# Patient Record
Sex: FEMALE | Race: WHITE | NOT HISPANIC OR LATINO | Employment: OTHER | URBAN - NONMETROPOLITAN AREA
[De-identification: names, ages, dates, MRNs, and addresses within clinical notes are randomized per-mention and may not be internally consistent; named-entity substitution may affect disease eponyms.]

---

## 2024-09-30 ENCOUNTER — TELEPHONE (OUTPATIENT)
Dept: NEUROSURGERY | Facility: CLINIC | Age: 79
End: 2024-09-30
Payer: MEDICARE

## 2024-09-30 NOTE — TELEPHONE ENCOUNTER
lvm to see if patient could come in on october 7th at 12:30      It is okay for the hub to relay the message to the patient and schedule if possible

## 2024-10-07 ENCOUNTER — HOSPITAL ENCOUNTER (OUTPATIENT)
Dept: CT IMAGING | Facility: HOSPITAL | Age: 79
Discharge: HOME | End: 2024-10-07
Admitting: PHYSICIAN ASSISTANT
Payer: MEDICARE

## 2024-10-07 ENCOUNTER — OFFICE VISIT (OUTPATIENT)
Dept: NEUROSURGERY | Facility: CLINIC | Age: 79
End: 2024-10-07
Payer: MEDICARE

## 2024-10-07 VITALS — HEIGHT: 64 IN | BODY MASS INDEX: 32.91 KG/M2 | WEIGHT: 192.8 LBS

## 2024-10-07 DIAGNOSIS — E66.811 CLASS 1 OBESITY DUE TO EXCESS CALORIES WITHOUT SERIOUS COMORBIDITY WITH BODY MASS INDEX (BMI) OF 33.0 TO 33.9 IN ADULT: ICD-10-CM

## 2024-10-07 DIAGNOSIS — M54.2 CERVICALGIA: ICD-10-CM

## 2024-10-07 DIAGNOSIS — G44.311 INTRACTABLE ACUTE POST-TRAUMATIC HEADACHE: ICD-10-CM

## 2024-10-07 DIAGNOSIS — M51.362 DEGENERATION OF INTERVERTEBRAL DISC OF LUMBAR REGION WITH DISCOGENIC BACK PAIN AND LOWER EXTREMITY PAIN: ICD-10-CM

## 2024-10-07 DIAGNOSIS — G44.311 INTRACTABLE ACUTE POST-TRAUMATIC HEADACHE: Primary | ICD-10-CM

## 2024-10-07 DIAGNOSIS — E66.09 CLASS 1 OBESITY DUE TO EXCESS CALORIES WITHOUT SERIOUS COMORBIDITY WITH BODY MASS INDEX (BMI) OF 33.0 TO 33.9 IN ADULT: ICD-10-CM

## 2024-10-07 DIAGNOSIS — M48.062 SPINAL STENOSIS, LUMBAR REGION, WITH NEUROGENIC CLAUDICATION: ICD-10-CM

## 2024-10-07 DIAGNOSIS — Z78.9 NONSMOKER: ICD-10-CM

## 2024-10-07 DIAGNOSIS — S32.010A CLOSED COMPRESSION FRACTURE OF BODY OF L1 VERTEBRA: ICD-10-CM

## 2024-10-07 PROCEDURE — 70450 CT HEAD/BRAIN W/O DYE: CPT

## 2024-10-07 PROCEDURE — 72125 CT NECK SPINE W/O DYE: CPT

## 2024-10-07 RX ORDER — LISINOPRIL 40 MG/1
1 TABLET ORAL DAILY
COMMUNITY

## 2024-10-07 RX ORDER — PREGABALIN 50 MG/1
CAPSULE ORAL
Qty: 30 CAPSULE | Refills: 0 | Status: SHIPPED | OUTPATIENT
Start: 2024-10-07

## 2024-10-07 RX ORDER — CITALOPRAM HYDROBROMIDE 20 MG/1
1 TABLET ORAL DAILY
COMMUNITY
Start: 2024-08-19

## 2024-10-07 RX ORDER — ALBUTEROL SULFATE 90 UG/1
2 INHALANT RESPIRATORY (INHALATION) EVERY 4 HOURS PRN
COMMUNITY

## 2024-10-07 RX ORDER — CYCLOBENZAPRINE HCL 10 MG
TABLET ORAL
COMMUNITY
Start: 2024-08-23

## 2024-10-07 RX ORDER — OMEGA-3S/DHA/EPA/FISH OIL/D3 300MG-1000
CAPSULE ORAL
COMMUNITY

## 2024-10-07 RX ORDER — FLUTICASONE PROPIONATE AND SALMETEROL 250; 50 UG/1; UG/1
POWDER RESPIRATORY (INHALATION)
COMMUNITY

## 2024-10-07 RX ORDER — ROPINIROLE 0.5 MG/1
1 TABLET, FILM COATED ORAL
COMMUNITY

## 2024-10-07 RX ORDER — ATORVASTATIN CALCIUM 20 MG/1
1 TABLET, FILM COATED ORAL DAILY
COMMUNITY

## 2024-10-07 RX ORDER — METOPROLOL SUCCINATE 50 MG/1
1 TABLET, EXTENDED RELEASE ORAL
COMMUNITY
Start: 2024-06-05

## 2024-10-07 RX ORDER — AMLODIPINE BESYLATE 10 MG/1
1 TABLET ORAL DAILY
COMMUNITY

## 2024-10-07 NOTE — PROGRESS NOTES
Chief complaint:   Chief Complaint   Patient presents with    Back Pain     Pt states having lower back pain and neck pain. Radiating pain in right leg. Had knee replacement on right knee       Subjective     HPI: Anitha is a 78 year-old female referred to us by ABDI Martinez for 2nd opinion chronic low back pain and compression fracture L1. PMH is significant for HTN, COPD, HL and RLS. Pt is accompanied by her son. She has Hx chronic low back pain with radiating pain into the right buttock around the right knee and into the lower leg and dorsal surface of the right foot.  She sustained a fall about a month ago.  She was walking backwards on a ramp and fell, landing on her buttocks and sustained an L1 compression fracture.  Pain persists, especially when she twists.  She does indicate that the pain is not as bad as it was 3 weeks ago.  She states at times her right leg feels weak and thinks she is maybe tripping with her right foot but has not fallen from that.  She is unable to stand very long or walk very far secondary to shortness of breath from emphysema and states that her legs also get weak and feel like they will not hold her.  She denies any paresthesias to the lower extremities.      She is also complaining of worsening chronic neck pain since her fall.  She has been seen and treated by Dr. Bradshaw and is on the schedule for what sounds like cervical facet injections with possible RFA, upcoming.  Since she fell, she has had worsening pain especially the right side of her neck that radiates to the deltoid region and around the right clavicle.  She has had about 8 physical therapy sessions at [facility] which is failed to provide any symptom improvement.  She also reports significant increase in her baseline headaches.  Headache is located mostly in the occipital region.  She has had some blurred vision over the past 2 days.  Her son denies that she has been confused.  She does not seem to be having any  "difficulty with word finding.  She is not on blood thinners.        Review of Systems     Past Medical History:   Diagnosis Date    Arthritis     Hypertension      Past Surgical History:   Procedure Laterality Date    TOTAL KNEE ARTHROPLASTY       History reviewed. No pertinent family history.  Social History     Tobacco Use    Smoking status: Never     Passive exposure: Never    Smokeless tobacco: Never   Vaping Use    Vaping status: Never Used     (Not in a hospital admission)    Allergies:  Patient has no known allergies.    Objective      Vital Signs  Ht 162.6 cm (64\")   Wt 87.5 kg (192 lb 12.8 oz)   BMI 33.09 kg/m²     Physical Exam  Constitutional:       Appearance: Normal appearance. She is well-developed.   HENT:      Head: Normocephalic.   Eyes:      General: Lids are normal.      Extraocular Movements: Extraocular movements intact.      Conjunctiva/sclera: Conjunctivae normal.   Neck:      Comments: Significant pain with cervical ROM, especially with extension and rotation to the right.  There is also some tenderness of the occipital condyles on the left.  Cardiovascular:      Rate and Rhythm: Normal rate.   Pulmonary:      Effort: Pulmonary effort is normal.      Breath sounds: Normal breath sounds.   Musculoskeletal:         General: Tenderness (TTP and percussion over the L1 region.) present.      Cervical back: Tenderness present.   Skin:     General: Skin is warm and dry.   Neurological:      Mental Status: She is oriented to person, place, and time.      GCS: GCS eye subscore is 4. GCS verbal subscore is 5. GCS motor subscore is 6.      Cranial Nerves: No cranial nerve deficit.      Sensory: No sensory deficit.      Motor: Motor strength is normal.No weakness.      Coordination: Coordination normal.      Gait: Gait normal.      Deep Tendon Reflexes: Reflexes normal.      Reflex Scores:       Tricep reflexes are 2+ on the right side and 2+ on the left side.       Bicep reflexes are 2+ on the right " side and 2+ on the left side.       Brachioradialis reflexes are 2+ on the right side and 2+ on the left side.       Patellar reflexes are 2+ on the right side and 2+ on the left side.       Achilles reflexes are 2+ on the right side and 2+ on the left side.  Psychiatric:         Speech: Speech normal.         Behavior: Behavior normal.         Thought Content: Thought content normal.         Neurological Exam  Mental Status  Awake, alert and oriented to person, place and time. Oriented to person, place, and time. Speech is normal. Follows two-step commands. Attention and concentration are normal. Fund of knowledge is appropriate for level of education.    Cranial Nerves  CN II: Visual acuity is normal. Visual fields full to confrontation.  CN III, IV, VI: Extraocular movements intact bilaterally. Normal lids and orbits bilaterally.   Right pupil: 3 mm. Round. Reactive to light. Reactive to accommodation.   Left pupil: 3 mm. Round. Reactive to light.  CN V: Facial sensation is normal.  CN VII: Full and symmetric facial movement.  CN IX, X: Palate elevates symmetrically. Normal gag reflex.  CN XI: Shoulder shrug strength is normal.  CN XII: Tongue midline without atrophy or fasciculations.    Motor  Decreased muscle bulk throughout. Normal muscle tone. Strength is 5/5 throughout all four extremities.    Sensory  Sensation is intact to light touch, pinprick, vibration and proprioception in all four extremities.    Reflexes                                            Right                      Left  Brachioradialis                    2+                         2+  Biceps                                 2+                         2+  Triceps                                2+                         2+  Finger flex                           2+                         2+  Hamstring                            2+                         2+  Patellar                                2+                         2+  Achilles                                 2+                         2+    Right pathological reflexes: Riley's absent. Ankle clonus absent.  Left pathological reflexes: Riley's absent. Ankle clonus absent.    Coordination  Right: Rapid alternating movement normal.    Gait   Normal gait.Casual gait is normal including stance, stride, and arm swing.       Imaging review: MRI of the lumbar spine was reviewed and demonstrates severe disc degeneration L4-5 and L5-S1.  There is anterior listhesis L4 over L5 with severe central stenosis.  There is moderate right neuroforaminal stenosis at L4-5 and L5-S1.  There is lateral recess stenosis at both levels and what appears to be a right eccentric disc herniation at L5-S1.  There is an acute compression deformity, 30% of L1.  No retropulsion.    CT Cervical Spine Without Contrast    Result Date: 10/7/2024  1. No evidence of cervical spine fracture. Straightening on the sagittal images is likely positional. 2. Degenerative changes of the cervical spine, as described.     This report was signed and finalized on 10/7/2024 1:03 PM by Dr. Edwin Garcia MD.      CT Head Without Contrast    Result Date: 10/7/2024  No acute intracranial abnormality. There are chronic findings associated with aging.   This report was signed and finalized on 10/7/2024 12:59 PM by Dr. Tushar Ang MD.           Assessment/Plan: Anitha is having severe occipital headache with blurred vision over the past 2 days as well as severe right-sided neck pain.  Both issues have escalated since her fall 1 month ago.  She is neurologically stable but given the fact that her headache is so severe as well as the vision changes, I sent her for a stat CT of the head without contrast to make sure she does not have any bleeding as well as  CT of the cervical spine to better evaluate her cervical issues.  Fortunately, her CT of the head did not show anything acute.  Her cervical CT however shows significant spondylosis C1-C2,  C3-C4 and C4-C5 on the right, consistent with a right sided neck pain. No acute findings.    Images were reviewed in detail by myself and Dr. Glover.    I reviewed lumbar images in detail with Anitha and her son.  She has severe central stenosis at L4-5 with anterior listhesis consistent with her radicular complaints as well as an acute L1 compression fracture.    Regarding her lumbar issues, I recommend an LSO brace when standing and walking to prevent any bending or twisting as well as 10 pound lifting restriction over the next 4 to 6 weeks to see how she does.  With her breathing issues, general anesthesia might present some risk even though it is a relatively small surgery.    Regarding her cervical issues, she is neurologically stable but having significant pain.  She is scheduled for an upcoming procedure with Dr. Bradshaw, cervical facet rhizotomies at C4-5 and C5-6.  We will send my note over his office.  She may benefit from facet injections with possible RFA in the upper cervical levels.    I would like to get both lumbar and cervical flexion-extension films today to see if there is any instability.    We will also proceed with MRI of the cervical spine to see if there is any significant stenosis that needs addressed surgically.    I offered her a small dose of Lyrica, 50 mg to utilize at bedtime as needed for nerve pain.    We discussed avoidance of aggravating activities as well as protective body mechanics.  She will follow-up in the office after the cervical MRI is completed, same day.    She can call for sooner appointment if she has any worsening pain, focal weakness or other issues or concerns.      Patient is a nonsmoker    The patient's Body mass index is 33.09 kg/m².. BMI is above normal parameters. Recommendations include: educational material    ADVANCED CARE PLANNING  Information on advance directives provided in AVS.    SEN Fall Risk Assessment was completed, and patient is at HIGH risk for  falls. Assessment completed on:10/7/2024       Diagnoses and all orders for this visit:    1. Intractable acute post-traumatic headache (Primary)  -     CT head wo contrast; Future    2. Closed compression fracture of body of L1 vertebra  -     XR Spine Lumbar AP & Lateral With Flex & Ext; Future  -     Miscellaneous DME    3. Cervicalgia  -     CT Cervical Spine Without Contrast; Future  -     pregabalin (Lyrica) 50 MG capsule; TAKE AT NIGHT FOR NERVE PAIN  Dispense: 30 capsule; Refill: 0  -     XR spine cervical ap and lat w flex and ext; Future  -     MRI Cervical Spine Without Contrast; Future    4. Degeneration of intervertebral disc of lumbar region with discogenic back pain and lower extremity pain  -     XR Spine Lumbar AP & Lateral With Flex & Ext; Future    5. Spinal stenosis, lumbar region, with neurogenic claudication  -     pregabalin (Lyrica) 50 MG capsule; TAKE AT NIGHT FOR NERVE PAIN  Dispense: 30 capsule; Refill: 0  -     XR Spine Lumbar AP & Lateral With Flex & Ext; Future    6. Nonsmoker    7. Class 1 obesity due to excess calories without serious comorbidity with body mass index (BMI) of 33.0 to 33.9 in adult      I spent 75 minutes caring for Anitha on this date of service. This time includes time spent by me in the following activities: preparing for the visit, reviewing tests, obtaining and/or reviewing a separately obtained history, performing a medically appropriate examination and/or evaluation, counseling and educating the patient/family/caregiver, ordering medications, tests, or procedures, referring and communicating with other health care professionals, documenting information in the medical record, independently interpreting results and communicating that information with the patient/family/caregiver, and care coordination.      I discussed the patients findings and my recommendations with patient    Getachew Sampson PA-C  10/08/24  10:10 CDT

## 2024-10-07 NOTE — PATIENT INSTRUCTIONS
Wear lumbar brace when standing and walking.  No lifting greater than 10 pounds.  You can continue Tylenol as current.  Dr. Glover will send you some tramadol and to reserve for more severe pain.  We are also sending Lyrica to help with the leg pain at night.  MRIs been ordered of the cervical spine.  We will have that completed with follow-up after on the same day.  If you have any worsening pain, focal weakness or other issues, call the office for sooner appointment.

## 2024-11-18 ENCOUNTER — HOSPITAL ENCOUNTER (OUTPATIENT)
Dept: MRI IMAGING | Facility: HOSPITAL | Age: 79
Discharge: HOME | End: 2024-11-18
Admitting: PHYSICIAN ASSISTANT
Payer: MEDICARE

## 2024-11-18 DIAGNOSIS — M54.2 CERVICALGIA: ICD-10-CM

## 2024-11-18 PROCEDURE — 72141 MRI NECK SPINE W/O DYE: CPT

## 2024-11-20 ENCOUNTER — HOSPITAL ENCOUNTER (OUTPATIENT)
Dept: GENERAL RADIOLOGY | Facility: HOSPITAL | Age: 79
Discharge: HOME | End: 2024-11-20
Payer: MEDICARE

## 2024-11-20 ENCOUNTER — OFFICE VISIT (OUTPATIENT)
Dept: NEUROSURGERY | Facility: CLINIC | Age: 79
End: 2024-11-20
Payer: MEDICARE

## 2024-11-20 VITALS — BODY MASS INDEX: 33.32 KG/M2 | WEIGHT: 195.2 LBS | HEIGHT: 64 IN

## 2024-11-20 DIAGNOSIS — M48.062 SPINAL STENOSIS, LUMBAR REGION, WITH NEUROGENIC CLAUDICATION: ICD-10-CM

## 2024-11-20 DIAGNOSIS — E66.09 CLASS 1 OBESITY DUE TO EXCESS CALORIES WITHOUT SERIOUS COMORBIDITY WITH BODY MASS INDEX (BMI) OF 33.0 TO 33.9 IN ADULT: ICD-10-CM

## 2024-11-20 DIAGNOSIS — S32.010A CLOSED COMPRESSION FRACTURE OF BODY OF L1 VERTEBRA: ICD-10-CM

## 2024-11-20 DIAGNOSIS — M51.362 DEGENERATION OF INTERVERTEBRAL DISC OF LUMBAR REGION WITH DISCOGENIC BACK PAIN AND LOWER EXTREMITY PAIN: ICD-10-CM

## 2024-11-20 DIAGNOSIS — M54.81 OCCIPITAL NEURALGIA OF RIGHT SIDE: Primary | ICD-10-CM

## 2024-11-20 DIAGNOSIS — G44.311 INTRACTABLE ACUTE POST-TRAUMATIC HEADACHE: ICD-10-CM

## 2024-11-20 DIAGNOSIS — Z78.9 NONSMOKER: ICD-10-CM

## 2024-11-20 DIAGNOSIS — M54.12 CERVICAL RADICULOPATHY: ICD-10-CM

## 2024-11-20 DIAGNOSIS — M50.30 DDD (DEGENERATIVE DISC DISEASE), CERVICAL: ICD-10-CM

## 2024-11-20 DIAGNOSIS — S32.010D COMPRESSION FRACTURE OF L1 VERTEBRA WITH ROUTINE HEALING, SUBSEQUENT ENCOUNTER: ICD-10-CM

## 2024-11-20 DIAGNOSIS — E66.811 CLASS 1 OBESITY DUE TO EXCESS CALORIES WITHOUT SERIOUS COMORBIDITY WITH BODY MASS INDEX (BMI) OF 33.0 TO 33.9 IN ADULT: ICD-10-CM

## 2024-11-20 DIAGNOSIS — M54.2 CERVICALGIA: ICD-10-CM

## 2024-11-20 PROCEDURE — 72050 X-RAY EXAM NECK SPINE 4/5VWS: CPT

## 2024-11-20 PROCEDURE — 72110 X-RAY EXAM L-2 SPINE 4/>VWS: CPT

## 2024-11-20 RX ORDER — OMEGA-3S/DHA/EPA/FISH OIL/D3 300MG-1000
CAPSULE ORAL DAILY
COMMUNITY

## 2024-11-20 RX ORDER — PREDNISONE 20 MG/1
20 TABLET ORAL 2 TIMES DAILY
Qty: 10 TABLET | Refills: 0 | Status: SHIPPED | OUTPATIENT
Start: 2024-11-20

## 2024-11-20 RX ORDER — ALBUTEROL SULFATE 90 UG/1
INHALANT RESPIRATORY (INHALATION) EVERY 6 HOURS PRN
COMMUNITY

## 2024-11-20 RX ORDER — METHOCARBAMOL 500 MG/1
500 TABLET, FILM COATED ORAL 3 TIMES DAILY
Qty: 90 TABLET | Refills: 0 | Status: SHIPPED | OUTPATIENT
Start: 2024-11-20 | End: 2024-11-21

## 2024-11-20 NOTE — PATIENT INSTRUCTIONS
Recommendations have been sent to Dr. Walton's office for occipital blocks to help your headaches as well as right C3-4 epidural steroid to help with the radiating pain from the right side of your neck.  Avoid any excessive cervical rotation as well as extension.  No heavy lifting or overhead lifting.  Prednisone has been sent to your pharmacy to utilize for the next 5 days.  I have also changed your muscle relaxer from cyclobenzaprine to Robaxin to see if this is more effective.  We have referred you to neurology for further evaluation of headaches.  Follow-up with Dr. Glover  If you have any worsening pain, focal weakness or other issues or concerns, call the office or seek treatment at the ER.

## 2024-11-20 NOTE — PROGRESS NOTES
"    Chief complaint:   Chief Complaint   Patient presents with    Follow-up     Pt reports to office for follow up after MRI- Pt states she has headaches all the time         Subjective     HPI: Anitha comes in today with her son for MRI review of the cervical spine. She continues with severe right sided neck pain that worsens with rotation to the right. She has radiating pain into the right trap and clavicle. She continues with right sided occipital tenderness with right hemicranial headaches. She had C4-7 MBB's on the right, Dr Weber and is scheduled for upcoming 2nd set.  Last visit I recommended right occipital blocks to see if this would help with her headaches which has not yet been completed.  She is taking Flexeril without symptom relief.  She does not feel that the Lyrica was beneficial.  She denies any weakness to the upper extremities and paresthesias.    She still wearing a lumbar brace.  She states that her back pain is doing fine.  She really has not have any back pain to speak of and does not have any radicular pain down the right leg.  She denies focal weakness and paresthesias.    Review of Systems      Objective      Vital Signs  Ht 162.6 cm (64.02\")   Wt 88.5 kg (195 lb 3.2 oz)   BMI 33.49 kg/m²     Physical Exam  Constitutional:       Appearance: Normal appearance. She is well-developed.   HENT:      Head: Normocephalic.   Eyes:      Pupils: Pupils are equal, round, and reactive to light.   Neck:      Comments: Positive Spurling's and significantly painful cervical rotation to the right.  There is exquisite tenderness right occipital condyles.  This reproduces headache.  Cardiovascular:      Rate and Rhythm: Normal rate.   Pulmonary:      Effort: Pulmonary effort is normal.   Musculoskeletal:         General: Normal range of motion.   Skin:     General: Skin is warm and dry.   Neurological:      Mental Status: She is oriented to person, place, and time.      GCS: GCS eye subscore is 4. GCS " verbal subscore is 5. GCS motor subscore is 6.      Cranial Nerves: No cranial nerve deficit.      Sensory: No sensory deficit.      Motor: Motor strength is normal.No weakness.      Gait: Gait normal.      Deep Tendon Reflexes:      Reflex Scores:       Tricep reflexes are 2+ on the right side and 2+ on the left side.       Bicep reflexes are 2+ on the right side and 2+ on the left side.       Brachioradialis reflexes are 2+ on the right side and 2+ on the left side.       Patellar reflexes are 2+ on the right side and 2+ on the left side.       Achilles reflexes are 2+ on the right side and 2+ on the left side.  Psychiatric:         Speech: Speech normal.         Behavior: Behavior normal.         Thought Content: Thought content normal.         Neurological Exam  Mental Status  Awake, alert and oriented to person, place and time. Oriented to person, place, and time. Speech is normal.    Cranial Nerves  CN III, IV, VI: Pupils equal round and reactive to light bilaterally.    Motor   Strength is 5/5 throughout all four extremities.    Sensory  Sensation is intact to light touch, pinprick, vibration and proprioception in all four extremities.    Reflexes                                            Right                      Left  Brachioradialis                    2+                         2+  Biceps                                 2+                         2+  Triceps                                2+                         2+  Patellar                                2+                         2+  Achilles                                2+                         2+    Gait   Normal gait.Casual gait is normal including stance, stride, and arm swing.       Imaging review: XR Spine Lumbar AP & Lateral With Flex & Ext    Result Date: 11/20/2024   1. Chronic superior endplate compression fracture at L1 without significant posterior displacement. 2. Osteopenia lower lumbar spondylosis and mid to lower lumbar facet  arthropathy without spondylolisthesis or instability.  This report was signed and finalized on 11/20/2024 4:34 PM by Jerel Harrell.      XR spine cervical ap and lat w flex and ext    Result Date: 11/20/2024  1. Mid cervical spondylosis and multilevel facet arthropathy with degenerative anterolisthesis at C4-C5 and no instability on the lateral flexion and extension views.  This report was signed and finalized on 11/20/2024 4:32 PM by Jerel Harrell.        MRI of the cervical spine dated 11/18/2024 was reviewed and demonstrates widely patent central cord.  There is no significant central stenosis throughout.  There is moderate right neuroforaminal stenosis at C3-4 and severe right neuroforaminal stenosis at C4-5.    Assessment/Plan: I reviewed images in detail with Anitha and her son.  Fortunately she does not have any significant central stenosis of her cervical spine but she does have neuroforaminal stenosis on the right at C3-4 and C4-5 consistent with her radicular complaints.  She also has a right hemicranial headache with exquisite tenderness over the right occipital condyles.  The headache and neck pain are severe.  She is neurologically intact.    I will send recommendations to Dr. Bradshaw's office for a right C4 ALICE to see if this will help with her radicular pain as well as right occipital block with steroid to see if we can help with her headaches.    To assist with pain today, we will change her from Flexeril to Robaxin since Flexeril has been ineffective and I will give her a 5-day course of prednisone to see if this will help with her pain.    Her lumbar symptoms are stable.  She is really not having any back pain so she can start weaning out of the LSO brace.    I think we will go ahead and get her referred to neurology for full headache workup.    She will follow-up with Dr. Glover next available appointment.  She will call for sooner appointment if she has any worsening pain, focal weakness or  other issues or concerns.    Patient is a nonsmoker    The patient's Body mass index is 33.49 kg/m².. BMI is above normal parameters. Recommendations include: educational material    ADVANCED CARE PLANNING  Information on advance directives provided in AVS.    SEN Fall Risk Assessment was completed, and patient is at HIGH risk for falls. Assessment completed on:10/7/2024     Diagnoses and all orders for this visit:    1. Occipital neuralgia of right side (Primary)  -     predniSONE (DELTASONE) 20 MG tablet; Take 1 tablet by mouth 2 (Two) Times a Day.  Dispense: 10 tablet; Refill: 0  -     Discontinue: methocarbamol (ROBAXIN) 500 MG tablet; Take 1 tablet by mouth 3 (Three) Times a Day.  Dispense: 90 tablet; Refill: 0    2. Intractable acute post-traumatic headache  -     Ambulatory Referral to Neurology    3. DDD (degenerative disc disease), cervical    4. Cervical radiculopathy    5. Compression fracture of L1 vertebra with routine healing, subsequent encounter    6. Spinal stenosis, lumbar region, with neurogenic claudication    7. Nonsmoker    8. Class 1 obesity due to excess calories without serious comorbidity with body mass index (BMI) of 33.0 to 33.9 in adult        I discussed the patients findings and my recommendations with patient  Getachew Sampson PA-C  11/21/24  10:10 CST

## 2024-11-21 RX ORDER — CYCLOBENZAPRINE HCL 10 MG
10 TABLET ORAL 3 TIMES DAILY PRN
Qty: 30 TABLET | Refills: 0 | Status: SHIPPED | OUTPATIENT
Start: 2024-11-21

## 2024-11-25 ENCOUNTER — TELEPHONE (OUTPATIENT)
Dept: NEUROSURGERY | Facility: CLINIC | Age: 79
End: 2024-11-25
Payer: MEDICARE

## 2024-11-25 NOTE — TELEPHONE ENCOUNTER
returned call to patient she lvm stating that the medication meshia had perscribed is helping.

## 2025-02-05 ENCOUNTER — OFFICE VISIT (OUTPATIENT)
Dept: NEUROSURGERY | Facility: CLINIC | Age: 80
End: 2025-02-05
Payer: MEDICARE

## 2025-02-05 VITALS — BODY MASS INDEX: 33.29 KG/M2 | HEIGHT: 64 IN | WEIGHT: 195 LBS

## 2025-02-05 DIAGNOSIS — E66.811 CLASS 1 OBESITY DUE TO EXCESS CALORIES WITH SERIOUS COMORBIDITY AND BODY MASS INDEX (BMI) OF 33.0 TO 33.9 IN ADULT: Primary | ICD-10-CM

## 2025-02-05 DIAGNOSIS — M50.30 DDD (DEGENERATIVE DISC DISEASE), CERVICAL: ICD-10-CM

## 2025-02-05 DIAGNOSIS — E66.09 CLASS 1 OBESITY DUE TO EXCESS CALORIES WITH SERIOUS COMORBIDITY AND BODY MASS INDEX (BMI) OF 33.0 TO 33.9 IN ADULT: Primary | ICD-10-CM

## 2025-02-05 PROCEDURE — 99214 OFFICE O/P EST MOD 30 MIN: CPT | Performed by: NEUROLOGICAL SURGERY

## 2025-02-05 RX ORDER — TRAMADOL HYDROCHLORIDE 50 MG/1
50 TABLET ORAL EVERY 6 HOURS PRN
COMMUNITY
Start: 2024-12-06

## 2025-02-05 RX ORDER — HYDROCODONE BITARTRATE AND ACETAMINOPHEN 5; 325 MG/1; MG/1
1 TABLET ORAL DAILY
COMMUNITY
Start: 2025-01-29

## 2025-02-05 NOTE — PROGRESS NOTES
"C4/5 ACDF    Chief complaint:   Chief Complaint   Patient presents with    Follow-up     Follow up compression fractures, headaches        Subjective     HPI: I had a chance to see Anitha today in follow-up and to review her imaging studies of the cervical spine.  Unfortunately Anitha is still having a significant amount of neck pain and arm pain and she clearly has rather advanced degenerative disc disease particularly at C4/5 which is consistent with her arm pain.  Because she has not gotten any relief with conservative management I did offer her an anterior cervical discectomy and fusion at C4/5.  On her flexion-extension films she also has some instability at this level with a significant anterolisthesis of C4 on C5 and this is what is most likely causing her symptoms.    Review of Systems      Objective      Vital Signs  Ht 162.6 cm (64\")   Wt 88.5 kg (195 lb)   BMI 33.47 kg/m²     Physical Exam  Eyes:      General: Lids are normal.      Extraocular Movements: Extraocular movements intact.   Neurological:      Motor: Motor strength is normal.  Psychiatric:         Speech: Speech normal.         Neurological Exam  Mental Status  Awake, alert and oriented to person, place and time. Oriented to person, place and time. Speech is normal. Language is fluent with no aphasia. Attention and concentration are normal. Fund of knowledge is appropriate for level of education.    Cranial Nerves  CN III, IV, VI: Extraocular movements intact bilaterally. Normal lids and orbits bilaterally.    Motor   No abnormal involuntary movements. Strength is 5/5 throughout all four extremities.    Gait  Casual gait is normal including stance, stride, and arm swing.       Imaging review: EXAM: XR SPINE CERVICAL AP AND LAT W FLEX AND EXT-      DATE: 11/20/2024 3:05 PM     HISTORY: cervicalgia; M54.2-Cervicalgia       COMPARISON: 10/7/2024 CT cervical spine.     TECHNIQUE:  Frontal and lateral views were obtained. 4.0 images.       FINDINGS: "    There is straightening which could indicate muscle spasm. There is  spondylosis with spurring of the endplates anteriorly and multilevel  facet arthropathy. There is anterior listhesis at C4-C5 measuring 0.2 cm  which is unchanged on the lateral flexion and extension views.        IMPRESSION:  1. Mid cervical spondylosis and multilevel facet arthropathy with  degenerative anterolisthesis at C4-C5 and no instability on the lateral  flexion and extension views.             Assessment/Plan:   C4/5 instability with anterolisthesis of C4 on C5 and severe neck and arm pain    Given the fact that Anitha has failed conservative management I did offer her an anterior cervical discectomy and fusion at C4/5 to see if we could relieve some of her neck and arm pain.  I did explain to her the risks and benefits of the procedure and she would like to proceed.  We will work on getting her medically cleared and on the operative schedule at our first opening.    Patient is a nonsmoker  The patient's Body mass index is 33.47 kg/m².. BMI is above normal parameters. Recommendations include: continue with current weight loss program    Diagnoses and all orders for this visit:    1. Class 1 obesity due to excess calories with serious comorbidity and body mass index (BMI) of 33.0 to 33.9 in adult (Primary)        I discussed the patients findings and my recommendations with patient  Moncho Glover DO  02/05/25  13:48 CST

## 2025-02-10 ENCOUNTER — TELEPHONE (OUTPATIENT)
Dept: NEUROSURGERY | Facility: CLINIC | Age: 80
End: 2025-02-10
Payer: MEDICARE

## 2025-02-10 NOTE — TELEPHONE ENCOUNTER
Provider: DR MULLEN    Caller: PENNY DURAN    Relationship to Patient: SELF      Phone Number: [phone]    Reason for Call: PT STATES HER PCP HAS SENT HER TO A NEUROLOGIST.  HE IS WANTING HER TO TRY SOME KIND OF THERAPY.  SHE IS WONDERING IF THIS DOESN'T WORK CAN SHE STILL BE SCHEDULED FOR THE SURGERY WITH DR MULLEN.    PLEASE ADVISE    When was the patient last seen: 2-5-25

## 2025-02-17 PROBLEM — E66.09 CLASS 1 OBESITY DUE TO EXCESS CALORIES WITH SERIOUS COMORBIDITY AND BODY MASS INDEX (BMI) OF 33.0 TO 33.9 IN ADULT: Status: ACTIVE | Noted: 2025-02-05

## 2025-02-17 PROBLEM — E66.811 CLASS 1 OBESITY DUE TO EXCESS CALORIES WITH SERIOUS COMORBIDITY AND BODY MASS INDEX (BMI) OF 33.0 TO 33.9 IN ADULT: Status: ACTIVE | Noted: 2025-02-05

## 2025-02-24 NOTE — TELEPHONE ENCOUNTER
PATIENT CALLED BACK IN ON SURGERY SCHEDULING     ATTEMPTED WT AND PER CAMILLE I AM SENDING T/E     PLEASE CALL PATIENT WITH ANY UPDATES ON SURGERY SCHEDULING     [phone] (home)     THANK YOU!

## 2025-02-24 NOTE — TELEPHONE ENCOUNTER
I HAVE RECEIVED A CASE REQUEST FROM DR MULLEN. PT WILL BE SCHEDULED FOR SURGERY IN THE ORDER THAT I RECEIVE CASE REQUESTS. IF PT CALLS BACK IT IS OK FOR HUB TO RELAY TO HER THAT I RECEIVED HER REQUEST ON 2/17 AND IT TYPICALLY TAKES ME 8 WEEKS OR SO TO SCHEDULE SURGERIES FOR DR MULLEN PATIENTS DUE TO THE HIGH VOLUME. CURRENTLY I AM WORKING ON MID JANUARY CASES FOR DR MULLEN AND THESE ARE BOOKING OUT TO LATE MARCH EARLY APRIL.     OK FOR [facility] TO RELAY THIS MESSAGE TO PT.

## 2025-03-05 RX ORDER — CYCLOBENZAPRINE HCL 10 MG
10 TABLET ORAL 3 TIMES DAILY PRN
Qty: 30 TABLET | Refills: 0 | Status: SHIPPED | OUTPATIENT
Start: 2025-03-05

## 2025-03-19 DIAGNOSIS — M54.2 CERVICALGIA: ICD-10-CM

## 2025-03-19 DIAGNOSIS — M48.062 SPINAL STENOSIS, LUMBAR REGION, WITH NEUROGENIC CLAUDICATION: ICD-10-CM

## 2025-03-19 RX ORDER — PREGABALIN 50 MG/1
CAPSULE ORAL
Qty: 30 CAPSULE | Refills: 0 | Status: SHIPPED | OUTPATIENT
Start: 2025-03-19

## 2025-04-02 ENCOUNTER — PRE-ADMISSION TESTING (OUTPATIENT)
Dept: PREADMISSION TESTING | Facility: HOSPITAL | Age: 80
End: 2025-04-02
Payer: MEDICARE

## 2025-04-02 VITALS
OXYGEN SATURATION: 89 % | WEIGHT: 193.78 LBS | SYSTOLIC BLOOD PRESSURE: 157 MMHG | BODY MASS INDEX: 34.34 KG/M2 | HEART RATE: 89 BPM | DIASTOLIC BLOOD PRESSURE: 78 MMHG | HEIGHT: 63 IN | RESPIRATION RATE: 18 BRPM

## 2025-04-02 LAB
ANION GAP SERPL CALCULATED.3IONS-SCNC: 11 MMOL/L (ref 5–15)
BUN SERPL-MCNC: 19 MG/DL (ref 8–23)
BUN/CREAT SERPL: 19.2 (ref 7–25)
CALCIUM SPEC-SCNC: 9.9 MG/DL (ref 8.6–10.5)
CHLORIDE SERPL-SCNC: 103 MMOL/L (ref 98–107)
CO2 SERPL-SCNC: 25 MMOL/L (ref 22–29)
CREAT SERPL-MCNC: 0.99 MG/DL (ref 0.57–1)
DEPRECATED RDW RBC AUTO: 45.3 FL (ref 37–54)
EGFRCR SERPLBLD CKD-EPI 2021: 58.5 ML/MIN/1.73
ERYTHROCYTE [DISTWIDTH] IN BLOOD BY AUTOMATED COUNT: 14.1 % (ref 12.3–15.4)
GLUCOSE SERPL-MCNC: 100 MG/DL (ref 65–99)
HCT VFR BLD AUTO: 38.8 % (ref 34–46.6)
HGB BLD-MCNC: 12.6 G/DL (ref 12–15.9)
MCH RBC QN AUTO: 28.6 PG (ref 26.6–33)
MCHC RBC AUTO-ENTMCNC: 32.5 G/DL (ref 31.5–35.7)
MCV RBC AUTO: 88.2 FL (ref 79–97)
PLATELET # BLD AUTO: 251 10*3/MM3 (ref 140–450)
PMV BLD AUTO: 10.5 FL (ref 6–12)
POTASSIUM SERPL-SCNC: 4.5 MMOL/L (ref 3.5–5.2)
RBC # BLD AUTO: 4.4 10*6/MM3 (ref 3.77–5.28)
SODIUM SERPL-SCNC: 139 MMOL/L (ref 136–145)
WBC NRBC COR # BLD AUTO: 6.91 10*3/MM3 (ref 3.4–10.8)

## 2025-04-02 PROCEDURE — 36415 COLL VENOUS BLD VENIPUNCTURE: CPT

## 2025-04-02 PROCEDURE — 80048 BASIC METABOLIC PNL TOTAL CA: CPT

## 2025-04-02 PROCEDURE — 85027 COMPLETE CBC AUTOMATED: CPT

## 2025-04-02 NOTE — DISCHARGE INSTRUCTIONS
Preparing for Surgery  Follow these instructions before the procedure:  Several days or weeks before your procedure  Medication(s) you need to stop   _______ days/week prior to surgery: ***      Ask your health care provider about:  Changing or stopping your regular medicines. This is especially important if you are taking diabetes medicines or blood thinners.  Taking medicines such as aspirin and ibuprofen. These medicines can thin your blood. Do not take these medicines unless your health care provider tells you to take them.  Taking over-the-counter medicines, vitamins, herbs, and supplements.    Contact your surgeon if you:  Develop a fever of more than 100.4°F (38°C) or other feelings of illness during the 48 hours before your surgery.  Have symptoms that get worse.  Have questions or concerns about your surgery.  If you are going home the same day of your surgery you will need to arrange for a responsible adult, age 18 years old or older, to drive you home from the hospital and stay with you for 24 hours. Verification of the  will be made prior to any procedure requiring sedation. You may not go home in a taxi or any form of public transportation by yourself.     Day before your procedure  Medication(s) you need to stop the day before your surgery:LISINOPRIL    24 hours before your procedure DO NOT drink alcoholic beverages or smoke.  24 hours before your procedure STOP taking Erectile Dysfunction medication (i.e.,Cialis, Viagra)   You may be asked to shower with a germ-killing soap.  Day of your procedure   You may take the following medication(s) the morning of surgery with a sip of water: METOPROLOL, NORCO      8 hours before your scheduled arrival time, STOP all food, any dairy products, and full liquids. This includes hard candy, chewing gum or mints. This is extremely important to prevent serious complications.     Up to 2 hours before your scheduled arrival time, you may have clear liquids no  cream, powder, or pulp of any kind. Safe options are water, black coffee, plain tea, soda, Gatorade/Powerade, clear broth, apple juice.    2 hours before your scheduled arrival time, STOP drinking clear liquids.    You may need to take another shower with a germ-killing soap before you leave home in the morning. Do not use perfumes, colognes, or body lotions.  Wear comfortable loose-fitting clothing.  Remove all jewelry including body piercing and rings, dark colored nail polish, and make up prior to arrival at the hospital. Leave all valuables at home.   Bring your hearing aids if you rely on them.  Do not wear contact lenses. If you wear eyeglasses remember to bring a case to store them in while you are in surgery.  Do not use denture adhesives since you will be asked to remove them during your surgery.    You do not need to bring your home medications into the hospital.   Bring your sleep apnea device with you on the day of your surgery (if this applies to you).  If you have an Inspire implant for sleep apnea, please bring the remote with you on the day of surgery.  If you wear portable oxygen, bring it with you.   If you are staying overnight, you may bring a bag of items you may need such as slippers, robe and a change of clothes for your discharge. You may want to leave these items in the car until you are ready for them since your family will take your belongings when you leave the pre-operative area.  Arrive at the hospital as scheduled by the office. You will be asked to arrive 2 hours prior to your surgery time in order to prepare for your procedure.  When you arrive at the hospital  Go to the registration desk located at the main entrance of the hospital.  After registration is completed, you will be given a beeper and a sticker sheet. Take the stickers to Outpatient Surgery and place in the tray at the end of the desk to notify the staff that you have arrived and registered.   Return to the lobby to  wait. You are not always called back according to the time of arrival but rather the time your doctor will be ready.  When your beeper lights up and vibrates proceed through the double doors, under the stairs, and a member of the Outpatient Surgery staff will escort you to your preoperative room.

## 2025-04-17 ENCOUNTER — HOSPITAL ENCOUNTER (OUTPATIENT)
Facility: HOSPITAL | Age: 80
Setting detail: HOSPITAL OUTPATIENT SURGERY
Discharge: HOME | End: 2025-04-17
Attending: NEUROLOGICAL SURGERY | Admitting: NEUROLOGICAL SURGERY
Payer: MEDICARE

## 2025-04-17 ENCOUNTER — APPOINTMENT (OUTPATIENT)
Dept: GENERAL RADIOLOGY | Facility: HOSPITAL | Age: 80
End: 2025-04-17
Payer: MEDICARE

## 2025-04-17 ENCOUNTER — ANESTHESIA EVENT (OUTPATIENT)
Dept: PERIOP | Facility: HOSPITAL | Age: 80
End: 2025-04-17
Payer: MEDICARE

## 2025-04-17 ENCOUNTER — ANESTHESIA (OUTPATIENT)
Dept: PERIOP | Facility: HOSPITAL | Age: 80
End: 2025-04-17
Payer: MEDICARE

## 2025-04-17 VITALS
RESPIRATION RATE: 16 BRPM | WEIGHT: 194.45 LBS | DIASTOLIC BLOOD PRESSURE: 74 MMHG | HEART RATE: 93 BPM | OXYGEN SATURATION: 92 % | BODY MASS INDEX: 34.45 KG/M2 | TEMPERATURE: 98.6 F | SYSTOLIC BLOOD PRESSURE: 149 MMHG

## 2025-04-17 DIAGNOSIS — E66.811 CLASS 1 OBESITY DUE TO EXCESS CALORIES WITH SERIOUS COMORBIDITY AND BODY MASS INDEX (BMI) OF 33.0 TO 33.9 IN ADULT: ICD-10-CM

## 2025-04-17 DIAGNOSIS — E66.09 CLASS 1 OBESITY DUE TO EXCESS CALORIES WITH SERIOUS COMORBIDITY AND BODY MASS INDEX (BMI) OF 33.0 TO 33.9 IN ADULT: ICD-10-CM

## 2025-04-17 DIAGNOSIS — M54.12 CERVICAL RADICULOPATHY: Primary | ICD-10-CM

## 2025-04-17 LAB — GLUCOSE BLDC GLUCOMTR-MCNC: 100 MG/DL (ref 70–130)

## 2025-04-17 PROCEDURE — C1713 ANCHOR/SCREW BN/BN,TIS/BN: HCPCS | Performed by: NEUROLOGICAL SURGERY

## 2025-04-17 PROCEDURE — 25010000002 VANCOMYCIN 1 G RECONSTITUTED SOLUTION 1 EACH VIAL: Performed by: NEUROLOGICAL SURGERY

## 2025-04-17 PROCEDURE — 25010000002 FENTANYL CITRATE (PF) 100 MCG/2ML SOLUTION: Performed by: NURSE ANESTHETIST, CERTIFIED REGISTERED

## 2025-04-17 PROCEDURE — 25010000002 PROPOFOL 10 MG/ML EMULSION: Performed by: NURSE ANESTHETIST, CERTIFIED REGISTERED

## 2025-04-17 PROCEDURE — 25010000002 DEXAMETHASONE PER 1 MG: Performed by: NURSE ANESTHETIST, CERTIFIED REGISTERED

## 2025-04-17 PROCEDURE — 94640 AIRWAY INHALATION TREATMENT: CPT

## 2025-04-17 PROCEDURE — 82948 REAGENT STRIP/BLOOD GLUCOSE: CPT

## 2025-04-17 PROCEDURE — 25010000002 LIDOCAINE PF 2% 2 % SOLUTION: Performed by: NURSE ANESTHETIST, CERTIFIED REGISTERED

## 2025-04-17 PROCEDURE — 25010000002 ONDANSETRON PER 1 MG: Performed by: NURSE ANESTHETIST, CERTIFIED REGISTERED

## 2025-04-17 PROCEDURE — 25010000002 ONDANSETRON PER 1 MG: Performed by: ANESTHESIOLOGY

## 2025-04-17 PROCEDURE — 76000 FLUOROSCOPY <1 HR PHYS/QHP: CPT

## 2025-04-17 PROCEDURE — 72040 X-RAY EXAM NECK SPINE 2-3 VW: CPT

## 2025-04-17 PROCEDURE — 25810000003 LACTATED RINGERS PER 1000 ML: Performed by: NEUROLOGICAL SURGERY

## 2025-04-17 PROCEDURE — 25010000002 CEFAZOLIN 3 G RECONSTITUTED SOLUTION 1 EACH VIAL: Performed by: NEUROLOGICAL SURGERY

## 2025-04-17 DEVICE — PLATE 3001021 ZEVO 21MM 1 LVL
Type: IMPLANTABLE DEVICE | Site: SPINE CERVICAL | Status: FUNCTIONAL
Brand: ZEVO™ ANTERIOR CERVICAL PLATE SYSTEM

## 2025-04-17 DEVICE — BONE LORDOTIC ASR 6X14X11 FZD: Type: IMPLANTABLE DEVICE | Site: SPINE CERVICAL | Status: FUNCTIONAL

## 2025-04-17 RX ORDER — IBUPROFEN 600 MG/1
600 TABLET, FILM COATED ORAL EVERY 6 HOURS PRN
Status: DISCONTINUED | OUTPATIENT
Start: 2025-04-17 | End: 2025-04-17 | Stop reason: HOSPADM

## 2025-04-17 RX ORDER — ONDANSETRON 2 MG/ML
INJECTION INTRAMUSCULAR; INTRAVENOUS AS NEEDED
Status: DISCONTINUED | OUTPATIENT
Start: 2025-04-17 | End: 2025-04-17 | Stop reason: SURG

## 2025-04-17 RX ORDER — SODIUM CHLORIDE 9 MG/ML
40 INJECTION, SOLUTION INTRAVENOUS AS NEEDED
Status: DISCONTINUED | OUTPATIENT
Start: 2025-04-17 | End: 2025-04-17 | Stop reason: HOSPADM

## 2025-04-17 RX ORDER — LIDOCAINE HYDROCHLORIDE 10 MG/ML
0.5 INJECTION, SOLUTION EPIDURAL; INFILTRATION; INTRACAUDAL; PERINEURAL ONCE AS NEEDED
Status: DISCONTINUED | OUTPATIENT
Start: 2025-04-17 | End: 2025-04-17 | Stop reason: HOSPADM

## 2025-04-17 RX ORDER — SODIUM CHLORIDE 0.9 % (FLUSH) 0.9 %
3-10 SYRINGE (ML) INJECTION AS NEEDED
Status: DISCONTINUED | OUTPATIENT
Start: 2025-04-17 | End: 2025-04-17 | Stop reason: HOSPADM

## 2025-04-17 RX ORDER — MAGNESIUM HYDROXIDE 1200 MG/15ML
LIQUID ORAL AS NEEDED
Status: DISCONTINUED | OUTPATIENT
Start: 2025-04-17 | End: 2025-04-17 | Stop reason: HOSPADM

## 2025-04-17 RX ORDER — HYDROCODONE BITARTRATE AND ACETAMINOPHEN 5; 325 MG/1; MG/1
1 TABLET ORAL EVERY 6 HOURS PRN
Qty: 20 TABLET | Refills: 0 | Status: SHIPPED | OUTPATIENT
Start: 2025-04-17 | End: 2025-04-22

## 2025-04-17 RX ORDER — HYDROMORPHONE HYDROCHLORIDE 1 MG/ML
0.5 INJECTION, SOLUTION INTRAMUSCULAR; INTRAVENOUS; SUBCUTANEOUS
Status: DISCONTINUED | OUTPATIENT
Start: 2025-04-17 | End: 2025-04-17 | Stop reason: HOSPADM

## 2025-04-17 RX ORDER — SODIUM CHLORIDE, SODIUM LACTATE, POTASSIUM CHLORIDE, CALCIUM CHLORIDE 600; 310; 30; 20 MG/100ML; MG/100ML; MG/100ML; MG/100ML
1000 INJECTION, SOLUTION INTRAVENOUS CONTINUOUS
Status: DISCONTINUED | OUTPATIENT
Start: 2025-04-17 | End: 2025-04-17 | Stop reason: HOSPADM

## 2025-04-17 RX ORDER — DEXAMETHASONE SODIUM PHOSPHATE 4 MG/ML
INJECTION, SOLUTION INTRA-ARTICULAR; INTRALESIONAL; INTRAMUSCULAR; INTRAVENOUS; SOFT TISSUE AS NEEDED
Status: DISCONTINUED | OUTPATIENT
Start: 2025-04-17 | End: 2025-04-17 | Stop reason: SURG

## 2025-04-17 RX ORDER — SODIUM CHLORIDE 0.9 % (FLUSH) 0.9 %
3 SYRINGE (ML) INJECTION EVERY 12 HOURS SCHEDULED
Status: DISCONTINUED | OUTPATIENT
Start: 2025-04-17 | End: 2025-04-17 | Stop reason: HOSPADM

## 2025-04-17 RX ORDER — SODIUM CHLORIDE 0.9 % (FLUSH) 0.9 %
3 SYRINGE (ML) INJECTION AS NEEDED
Status: DISCONTINUED | OUTPATIENT
Start: 2025-04-17 | End: 2025-04-17 | Stop reason: HOSPADM

## 2025-04-17 RX ORDER — SODIUM CHLORIDE, SODIUM LACTATE, POTASSIUM CHLORIDE, CALCIUM CHLORIDE 600; 310; 30; 20 MG/100ML; MG/100ML; MG/100ML; MG/100ML
100 INJECTION, SOLUTION INTRAVENOUS CONTINUOUS
Status: DISCONTINUED | OUTPATIENT
Start: 2025-04-17 | End: 2025-04-17 | Stop reason: HOSPADM

## 2025-04-17 RX ORDER — PROPOFOL 10 MG/ML
VIAL (ML) INTRAVENOUS AS NEEDED
Status: DISCONTINUED | OUTPATIENT
Start: 2025-04-17 | End: 2025-04-17 | Stop reason: SURG

## 2025-04-17 RX ORDER — MIDAZOLAM HYDROCHLORIDE 2 MG/2ML
0.5 INJECTION, SOLUTION INTRAMUSCULAR; INTRAVENOUS
Status: DISCONTINUED | OUTPATIENT
Start: 2025-04-17 | End: 2025-04-17 | Stop reason: HOSPADM

## 2025-04-17 RX ORDER — IPRATROPIUM BROMIDE AND ALBUTEROL SULFATE 2.5; .5 MG/3ML; MG/3ML
3 SOLUTION RESPIRATORY (INHALATION) ONCE
Status: COMPLETED | OUTPATIENT
Start: 2025-04-17 | End: 2025-04-17

## 2025-04-17 RX ORDER — SUCCINYLCHOLINE/SOD CL,ISO/PF 200MG/10ML
SYRINGE (ML) INTRAVENOUS AS NEEDED
Status: DISCONTINUED | OUTPATIENT
Start: 2025-04-17 | End: 2025-04-17 | Stop reason: SURG

## 2025-04-17 RX ORDER — OXYCODONE AND ACETAMINOPHEN 10; 325 MG/1; MG/1
1 TABLET ORAL EVERY 4 HOURS PRN
Status: DISCONTINUED | OUTPATIENT
Start: 2025-04-17 | End: 2025-04-17 | Stop reason: HOSPADM

## 2025-04-17 RX ORDER — FLUMAZENIL 0.1 MG/ML
0.2 INJECTION INTRAVENOUS AS NEEDED
Status: DISCONTINUED | OUTPATIENT
Start: 2025-04-17 | End: 2025-04-17 | Stop reason: HOSPADM

## 2025-04-17 RX ORDER — SODIUM CHLORIDE 0.9 % (FLUSH) 0.9 %
10 SYRINGE (ML) INJECTION AS NEEDED
Status: DISCONTINUED | OUTPATIENT
Start: 2025-04-17 | End: 2025-04-17 | Stop reason: HOSPADM

## 2025-04-17 RX ORDER — LIDOCAINE HYDROCHLORIDE 20 MG/ML
INJECTION, SOLUTION EPIDURAL; INFILTRATION; INTRACAUDAL; PERINEURAL AS NEEDED
Status: DISCONTINUED | OUTPATIENT
Start: 2025-04-17 | End: 2025-04-17 | Stop reason: SURG

## 2025-04-17 RX ORDER — FENTANYL CITRATE 50 UG/ML
50 INJECTION, SOLUTION INTRAMUSCULAR; INTRAVENOUS
Status: DISCONTINUED | OUTPATIENT
Start: 2025-04-17 | End: 2025-04-17 | Stop reason: HOSPADM

## 2025-04-17 RX ORDER — LABETALOL HYDROCHLORIDE 5 MG/ML
5 INJECTION, SOLUTION INTRAVENOUS
Status: DISCONTINUED | OUTPATIENT
Start: 2025-04-17 | End: 2025-04-17 | Stop reason: HOSPADM

## 2025-04-17 RX ORDER — FENTANYL CITRATE 50 UG/ML
INJECTION, SOLUTION INTRAMUSCULAR; INTRAVENOUS AS NEEDED
Status: DISCONTINUED | OUTPATIENT
Start: 2025-04-17 | End: 2025-04-17 | Stop reason: SURG

## 2025-04-17 RX ORDER — UBIDECARENONE 75 MG
100 CAPSULE ORAL DAILY
COMMUNITY

## 2025-04-17 RX ORDER — EPHEDRINE SULFATE 50 MG/ML
INJECTION, SOLUTION INTRAVENOUS AS NEEDED
Status: DISCONTINUED | OUTPATIENT
Start: 2025-04-17 | End: 2025-04-17 | Stop reason: SURG

## 2025-04-17 RX ORDER — ACETAMINOPHEN 500 MG
1000 TABLET ORAL ONCE
Status: COMPLETED | OUTPATIENT
Start: 2025-04-17 | End: 2025-04-17

## 2025-04-17 RX ORDER — ROCURONIUM BROMIDE 10 MG/ML
INJECTION, SOLUTION INTRAVENOUS AS NEEDED
Status: DISCONTINUED | OUTPATIENT
Start: 2025-04-17 | End: 2025-04-17 | Stop reason: SURG

## 2025-04-17 RX ORDER — NALOXONE HCL 0.4 MG/ML
0.04 VIAL (ML) INJECTION AS NEEDED
Status: DISCONTINUED | OUTPATIENT
Start: 2025-04-17 | End: 2025-04-17 | Stop reason: HOSPADM

## 2025-04-17 RX ORDER — ONDANSETRON 2 MG/ML
4 INJECTION INTRAMUSCULAR; INTRAVENOUS
Status: DISCONTINUED | OUTPATIENT
Start: 2025-04-17 | End: 2025-04-17 | Stop reason: HOSPADM

## 2025-04-17 RX ORDER — LIDOCAINE HYDROCHLORIDE 40 MG/ML
SOLUTION TOPICAL AS NEEDED
Status: DISCONTINUED | OUTPATIENT
Start: 2025-04-17 | End: 2025-04-17 | Stop reason: SURG

## 2025-04-17 RX ADMIN — DEXAMETHASONE SODIUM PHOSPHATE 8 MG: 4 INJECTION, SOLUTION INTRA-ARTICULAR; INTRALESIONAL; INTRAMUSCULAR; INTRAVENOUS; SOFT TISSUE at 13:41

## 2025-04-17 RX ADMIN — ONDANSETRON 4 MG: 2 INJECTION INTRAMUSCULAR; INTRAVENOUS at 17:11

## 2025-04-17 RX ADMIN — OXYCODONE AND ACETAMINOPHEN 1 TABLET: 325; 10 TABLET ORAL at 15:09

## 2025-04-17 RX ADMIN — SODIUM CHLORIDE 3000 MG: 900 INJECTION INTRAVENOUS at 13:34

## 2025-04-17 RX ADMIN — ONDANSETRON 4 MG: 2 INJECTION INTRAMUSCULAR; INTRAVENOUS at 14:12

## 2025-04-17 RX ADMIN — EPHEDRINE SULFATE 25 MG: 50 INJECTION, SOLUTION INTRAVENOUS at 13:34

## 2025-04-17 RX ADMIN — EPHEDRINE SULFATE 25 MG: 50 INJECTION, SOLUTION INTRAVENOUS at 13:29

## 2025-04-17 RX ADMIN — IPRATROPIUM BROMIDE AND ALBUTEROL SULFATE 3 ML: .5; 3 SOLUTION RESPIRATORY (INHALATION) at 15:42

## 2025-04-17 RX ADMIN — ACETAMINOPHEN TAB 500 MG 1000 MG: 500 TAB at 12:51

## 2025-04-17 RX ADMIN — ROCURONIUM 10 MG: 50 INJECTION, SOLUTION INTRAVENOUS at 13:22

## 2025-04-17 RX ADMIN — Medication 100 MG: at 13:22

## 2025-04-17 RX ADMIN — LIDOCAINE HYDROCHLORIDE 100 MG: 20 INJECTION, SOLUTION EPIDURAL; INFILTRATION; INTRACAUDAL; PERINEURAL at 13:22

## 2025-04-17 RX ADMIN — SODIUM CHLORIDE, POTASSIUM CHLORIDE, SODIUM LACTATE AND CALCIUM CHLORIDE 1000 ML: 600; 310; 30; 20 INJECTION, SOLUTION INTRAVENOUS at 12:46

## 2025-04-17 RX ADMIN — FENTANYL CITRATE 25 MCG: 50 INJECTION, SOLUTION INTRAMUSCULAR; INTRAVENOUS at 13:22

## 2025-04-17 RX ADMIN — FENTANYL CITRATE 75 MCG: 50 INJECTION, SOLUTION INTRAMUSCULAR; INTRAVENOUS at 13:40

## 2025-04-17 RX ADMIN — PROPOFOL 100 MG: 10 INJECTION, EMULSION INTRAVENOUS at 13:22

## 2025-04-17 RX ADMIN — LIDOCAINE HYDROCHLORIDE 1 EACH: 40 SOLUTION TOPICAL at 13:22

## 2025-04-17 NOTE — ANESTHESIA PROCEDURE NOTES
Airway  Reason: elective    Date/Time: 4/17/2025 1:23 PM  Airway not difficult    General Information and Staff    Patient location during procedure: OR  CRNA/CAA: Erasmo Jade CRNA    Indications and Patient Condition  Indications for airway management: airway protection    Preoxygenated: yes    Mask difficulty assessment: 1 - vent by mask    Final Airway Details    Final airway type: endotracheal airway      Successful airway: ETT  Cuffed: yes   Successful intubation technique: direct laryngoscopy and video laryngoscopy  Endotracheal tube insertion site: oral  Blade: Vargas  Blade size: 2  ETT size (mm): 7.0  Cormack-Lehane Classification: grade I - full view of glottis  Placement verified by: capnometry   Measured from: lips  ETT/EBT  to lips (cm): 21  Number of attempts at approach: 1  Assessment: lips, teeth, and gum same as pre-op and atraumatic intubation

## 2025-04-17 NOTE — ANESTHESIA PREPROCEDURE EVALUATION
Anesthesia Evaluation     Patient summary reviewed   no history of anesthetic complications:   NPO Solid Status: > 8 hours             Airway   Mallampati: II  TM distance: >3 FB  Neck ROM: limited  Dental    (+) edentulous    Pulmonary    (-) sleep apnea, no home oxygen  Cardiovascular   Exercise tolerance: good (4-7 METS)    Patient on routine beta blocker    (+) hypertension  (-) past MI, cardiac stents, CABG      Neuro/Psych  (-) seizures, CVA  GI/Hepatic/Renal/Endo    (+) obesity  (-) diabetes    Musculoskeletal     Abdominal   (+) obese   Substance History      OB/GYN          Other                          Anesthesia Plan    ASA 3     general     intravenous induction     Anesthetic plan, risks, benefits, and alternatives have been provided, discussed and informed consent has been obtained with: patient.        CODE STATUS:          Nephrology

## 2025-04-17 NOTE — OP NOTE
NEUROSURGERY OPERATIVE NOTE    Anitha Dong  OR Date: 4/17/2025    Pre-op Diagnosis:   Class 1 obesity due to excess calories with serious comorbidity and body mass index (BMI) of 33.0 to 33.9 in adult [E66.811, E66.09, Z68.33]    Post-op Diagnosis:     Post-Op Diagnosis Codes:     * Class 1 obesity due to excess calories with serious comorbidity and body mass index (BMI) of 33.0 to 33.9 in adult [E66.811, E66.09, Z68.33]      No CPT Code Applied in Case Entry    Surgeon(s):  Moncho Glover,     Anesthesia: General    Staff:   Circulator: Anika Rouse RN; Roni Dinero RN  Scrub Person: Ella Horton; Khadra Whiteside CST  Vendor Representative: Francisco Turner    Procedure(s):  CERVICAL DISCECTOMY ANTERIOR FUSION WITH INSTRUMENTATION C4/5    Spinal Surgery Levels Completed:2 Levels        Estimated Blood Loss: Minimal    Complications: None    Implants:   Implant Name Type Inv. Item Serial No.  Lot No. LRB No. Used Action   BONE LORDOTIC ASR [id number] FZD - [id number] Implant BONE LORDOTIC ASR [id number] FZD  [employer] [id number]  1 Implanted   PLT ACP ZEVO 1LVL 21MM NS - [id number] Implant PLT ACP ZEVO 1LVL 21MM NS  [employer] [id number]  1 Implanted   SCRW ZEVO MARIAMA SD 3.5X13MM - [id number] Implant SCRW ZEVO MARIAMA SD 3.5X13MM  [employer] [id number]  4 Implanted       Specimens:                None      Drains: * No LDAs found *     Procedure    Patient is a 79-year-old female who unfortunately has some instability at C4/5 and severe neck pain and arm pain consistent with this level.  Because she failed conservative management I did offer her an anterior cervical discectomy and fusion at C4/5 to stabilize this level and hopefully give her some relief.  I did explain to her the risks and benefits of the procedure and she wished to proceed.  She is brought to the operative suite and put under general anesthetic and once under general anesthetic she was placed onto the  operative table and padded at all the appropriate points.  We were then able to prepped and draped in a sterile fashion using a 5-minute DuraPrep scrub sterile towels [place] and sterile drapes.  We then marked our incision site and infiltrated with 1% lidocaine with epinephrine and then opened with a 10 blade and dissected down to the platysma muscle.  We then opened the platysma muscle in a longitudinal fashion and then dissected medial to the sternocleidomastoid and medial to the carotid sheath to enter the prevertebral space.  Once in the prevertebral space we were able to clean off C4-5 and dissect the longus coli bilaterally and then placed a retractor into [place] pins into C4 and C5 and then distracted the disc space 3 clicks.  We then did an annulotomy and an aggressive discectomy all the way to the posterior longitudinal ligament and then sized her for a 6 mm structural allograft from [employer] ([employer]antage) which was placed in the interspace and fit quite nicely.  We then used a 21 mm plate fixated with 13 mm screws and all of the AntiBac out mechanisms were engaged.  We were then able to get meticulous hemostasis with the bipolar cautery and then closed the skin with 3-0 Vicryl Mastisol Steri-Strips Telfa and OpSite.  Patient went to postoperative recovery in stable condition.

## 2025-04-17 NOTE — H&P
H&P    CC: Neck pain          HPI: Patient is a 79-year-old female who unfortunately has severe neck and arm pain secondary to instability at C4/5 and is here today for an anterior cervical discectomy and fusion at C4/5.    Review of Systems     Pertinent positives/negatives documented in HPI.  All other systems reviewed and negative.    Past Medical History:  has a past medical history of Arthritis, COPD (chronic obstructive pulmonary disease), Emphysema lung, and Hypertension.    Past Surgical History:  has a past surgical history that includes Total knee arthroplasty; Oophorectomy; and Knee arthroscopy.    Family History: family history is not on file.    Social History:  reports that she has never smoked. She has never been exposed to tobacco smoke. She has never used smokeless tobacco. She reports current alcohol use. She reports that she does not use drugs.    Allergies: Patient has no known allergies.    Medications: Scheduled Meds:acetaminophen, 1,000 mg, Oral, Once  ceFAZolin, 3,000 mg, Intravenous, Once  sodium chloride, 3 mL, Intravenous, Q12H      Continuous Infusions:lactated ringers, 1,000 mL  lactated ringers, 1,000 mL, Last Rate: 1,000 mL (04/17/25 1246)  lactated ringers, 100 mL/hr      PRN Meds:.  lidocaine PF 1%    midazolam    sodium chloride    sodium chloride    sodium chloride    sodium chloride     Objective:  Vital signs: (most recent): Blood pressure 178/83, pulse 67, temperature 97.9 °F (36.6 °C), temperature source Temporal, resp. rate 20, weight 88.2 kg (194 lb 7.1 oz), SpO2 93%.        Neurological Exam  Mental Status  Awake, alert and oriented to person, place and time. Oriented to person, place and time. Speech is normal. Language is fluent with no aphasia. Attention and concentration are normal. Fund of knowledge is appropriate for level of education.    Cranial Nerves  CN III, IV, VI: Extraocular movements intact bilaterally. Normal lids and orbits bilaterally.    Motor   No  abnormal involuntary movements. Strength is 5/5 throughout all four extremities.    Gait  Casual gait is normal including stance, stride, and arm swing.       Vital Signs  Temp:  [97.9 °F (36.6 °C)] 97.9 °F (36.6 °C)  Heart Rate:  [67-84] 67  Resp:  [20] 20  BP: (178)/(83) 178/83    Physical Exam  Eyes:      General: Lids are normal.      Extraocular Movements: Extraocular movements intact.   Neurological:      Motor: Motor strength is normal.  Psychiatric:         Speech: Speech normal.         Results Review:   I reviewed the patient's new clinical results.  I reviewed the patient's new imaging results and agree with the interpretation.  I reviewed the patient's other test results and agree with the interpretation          Lab Results (last 24 hours)       Procedure Component Value Units Date/Time    POC Glucose Once [[id number]]  (Normal) Collected: 04/17/25 1118    Specimen: Blood Updated: 04/17/25 1130     Glucose 100 mg/dL      Comment: : [id number] [place] JessicaMeter ID: [id number]                 Assessment/Plan:   Anterior cervical discectomy and fusion C4/5      Class 1 obesity due to excess calories with serious comorbidity and body mass index (BMI) of 33.0 to 33.9 in adult      I discussed the patient's findings and my recommendations with patient    Moncho BRETT Glover DO  04/17/25  12:51 CDT    I spent 30 minutes caring for Anitha on this date of service. This time includes time spent by me in the following activities: preparing for the visit, reviewing tests, and performing a medically appropriate examination and/or evaluation

## 2025-04-18 NOTE — ANESTHESIA POSTPROCEDURE EVALUATION
Patient: Anitha Dong    Procedure Summary       Date: 04/17/25 Room / Location:  [facility] OR  /  [facility] OR    Anesthesia Start: 1316 Anesthesia Stop: 1430    Procedure: CERVICAL DISCECTOMY ANTERIOR FUSION WITH INSTRUMENTATION C4/5 (Bilateral: Spine Cervical) Diagnosis:       Class 1 obesity due to excess calories with serious comorbidity and body mass index (BMI) of 33.0 to 33.9 in adult      (Class 1 obesity due to excess calories with serious comorbidity and body mass index (BMI) of 33.0 to 33.9 in adult [E66.811, E66.09, Z68.33])    Surgeons: Moncho Glover DO Provider: Erasmo Jade CRNA    Anesthesia Type: general ASA Status: 3            Anesthesia Type: general    Vitals  Vitals Value Taken Time   /79 04/17/25 16:01   Temp 98.6 °F (37 °C) 04/17/25 15:55   Pulse 90 04/17/25 16:01   Resp 16 04/17/25 16:01   SpO2 95 % 04/17/25 16:01           Post Anesthesia Care and Evaluation    Patient location during evaluation: PACU  Patient participation: complete - patient participated  Level of consciousness: awake and awake and alert  Pain score: 0  Pain management: adequate    Airway patency: patent  Anesthetic complications: No anesthetic complications  PONV Status: none  Cardiovascular status: acceptable  Respiratory status: acceptable  Hydration status: acceptable    Comments: Patient discharged according to acceptable Todd score per RN assessment. See nursing records for further information.     Blood pressure 149/74, pulse 93, temperature 98.6 °F (37 °C), temperature source Temporal, resp. rate 16, weight 88.2 kg (194 lb 7.1 oz), SpO2 92%.

## 2025-04-23 RX ORDER — CYCLOBENZAPRINE HCL 10 MG
10 TABLET ORAL 3 TIMES DAILY PRN
Qty: 30 TABLET | Refills: 0 | Status: SHIPPED | OUTPATIENT
Start: 2025-04-23

## 2025-05-08 ENCOUNTER — OFFICE VISIT (OUTPATIENT)
Dept: NEUROSURGERY | Facility: CLINIC | Age: 80
End: 2025-05-08
Payer: MEDICARE

## 2025-05-08 VITALS — HEIGHT: 63 IN | WEIGHT: 194.8 LBS | BODY MASS INDEX: 34.52 KG/M2

## 2025-05-08 DIAGNOSIS — E66.09 CLASS 1 OBESITY DUE TO EXCESS CALORIES WITH SERIOUS COMORBIDITY AND BODY MASS INDEX (BMI) OF 34.0 TO 34.9 IN ADULT: ICD-10-CM

## 2025-05-08 DIAGNOSIS — M50.30 DDD (DEGENERATIVE DISC DISEASE), CERVICAL: Primary | ICD-10-CM

## 2025-05-08 DIAGNOSIS — Z78.9 NONSMOKER: ICD-10-CM

## 2025-05-08 DIAGNOSIS — M54.81 OCCIPITAL NEURALGIA OF RIGHT SIDE: ICD-10-CM

## 2025-05-08 DIAGNOSIS — E66.811 CLASS 1 OBESITY DUE TO EXCESS CALORIES WITH SERIOUS COMORBIDITY AND BODY MASS INDEX (BMI) OF 34.0 TO 34.9 IN ADULT: ICD-10-CM

## 2025-05-08 RX ORDER — HYDROCODONE BITARTRATE AND ACETAMINOPHEN 5; 325 MG/1; MG/1
1 TABLET ORAL DAILY PRN
COMMUNITY

## 2025-05-08 RX ORDER — PRAMIPEXOLE DIHYDROCHLORIDE 0.12 MG/1
0.12 TABLET ORAL
COMMUNITY

## 2025-05-08 RX ORDER — CYCLOBENZAPRINE HCL 10 MG
1 TABLET ORAL 3 TIMES DAILY PRN
COMMUNITY

## 2025-05-08 RX ORDER — LIDOCAINE 50 MG/G
PATCH TOPICAL
COMMUNITY
Start: 2025-04-07

## 2025-05-08 RX ORDER — NORTRIPTYLINE HYDROCHLORIDE 50 MG/1
50 CAPSULE ORAL DAILY
COMMUNITY
Start: 2025-03-21 | End: 2025-09-18

## 2025-05-08 NOTE — PROGRESS NOTES
"    Chief complaint:   Chief Complaint   Patient presents with    Post-op     CERVICAL DISCECTOMY ANTERIOR FUSION WITH INSTRUMENTATION C4/5- pt states         Subjective     HPI: Anitha is 3 weeks status post ACDF C4-C5.  She is not having any pain in the left side of her neck but she is still having some pain right occipital condyle.  She is also having intermittent radiating pain from the right clavicle up to the right side of the neck and into the temple and around the right eye when she turns her head the wrong way.  Turning to the right provokes it.  Not so much to the left.  Pain also increases when she pushes herself out of the chair to get up and when she coughs or sneezes.  She denies any parascapular pain and radiating pain to the upper extremities as well as focal weakness.  No postop fevers or wound drainage.  No dysphagia.  She is off narcotic pain medication.  She feels that Tylenol works better.    Review of Systems      Objective      Vital Signs  Ht 160 cm (62.99\")   Wt 88.4 kg (194 lb 12.8 oz)   BMI 34.52 kg/m²     Physical Exam  Constitutional:       Appearance: Normal appearance. She is well-developed.   HENT:      Head: Normocephalic.   Eyes:      Pupils: Pupils are equal, round, and reactive to light.   Neck:      Comments: Exquisite tenderness right occipital condyles  Pulmonary:      Effort: Pulmonary effort is normal.   Musculoskeletal:         General: Normal range of motion.      Cervical back: Tenderness present.   Skin:     General: Skin is warm and dry.      Comments: Anterior cervical incision is healed   Neurological:      Mental Status: She is oriented to person, place, and time.      GCS: GCS eye subscore is 4. GCS verbal subscore is 5. GCS motor subscore is 6.      Cranial Nerves: No cranial nerve deficit.      Sensory: No sensory deficit.      Motor: Motor strength is normal.No weakness.      Gait: Gait normal.      Deep Tendon Reflexes: Reflexes are normal and symmetric. "   Psychiatric:         Speech: Speech normal.         Behavior: Behavior normal.         Thought Content: Thought content normal.         Neurological Exam  Mental Status  Awake, alert and oriented to person, place and time. Oriented to person, place, and time. Speech is normal.    Cranial Nerves  CN III, IV, VI: Pupils equal round and reactive to light bilaterally.    Motor  Decreased muscle bulk throughout. Strength is 5/5 throughout all four extremities.    Sensory  Sensation is intact to light touch, pinprick, vibration and proprioception in all four extremities.    Reflexes  Deep tendon reflexes are 2+ and symmetric in all four extremities.    Gait   Normal gait.Casual gait is normal including stance, stride, and arm swing.       Imaging review: No new images        Assessment/Plan: Anitha continues with some right periclavicular pain that radiates to the right temple and around the right eye with turning her head to the right or Valsalva type maneuvers.  She has exquisite tenderness right occipital condyles.  No radicular pain to the upper extremities.  She is neurologically stable and incision is healing well.    I would like for her to continue 10 pound lifting restriction and avoid any overhead lifting and reaching.  She would like to go back to work next week which is fine as long as she is able to maintain restrictions as well as soft cervical collar when ambulatory.    She declines medication refills today.    She will follow-up with Dr. Glover in 4 weeks with 4 views of cervical spine prior to that appointment.  If she has any issues or concerns before that visit, she will call the office for sooner appointment.    Patient is a nonsmoker    The patient's Body mass index is 34.52 kg/m².. BMI is above normal parameters. Recommendations include: educational material    ADVANCED CARE PLANNING  Information on advance directives provided in [facility] CHATTERJEE Fall Risk Assessment has not been completed.      Diagnoses and all orders for this visit:    1. DDD (degenerative disc disease), cervical (Primary)  -     XR spine cervical ap and lat w flex and ext; Future    2. Occipital neuralgia of right side    3. Nonsmoker    4. Class 1 obesity due to excess calories with serious comorbidity and body mass index (BMI) of 34.0 to 34.9 in adult        I discussed the patients findings and my recommendations with patient  Getachew Sampson PA-C  05/08/25  10:06 CDT

## 2025-05-08 NOTE — PATIENT INSTRUCTIONS
No lifting >10 pounds and no overhead lifting.  Wear soft cervical collar for comfort.  No driving within 4 hours of taking pain medication.  Call the office for any worsening of pain, new onset weakness, calf pain/swelling or any issues or concerns.   Xrays before next visit

## 2025-05-08 NOTE — LETTER
May 8, 2025     Patient: Anitha Dong   YOB: 1945   Date of Visit: 5/8/2025       To Whom It May Concern:    It is my medical opinion that Anitha Dong may return back to work on 05/12/2025 no lifting over 10 pounds over head reaching or lifting, must wear collar            Sincerely,        Getachew Sampson PA-C    CC: No Recipients

## 2025-06-03 RX ORDER — CYCLOBENZAPRINE HCL 10 MG
10 TABLET ORAL 3 TIMES DAILY PRN
Qty: 30 TABLET | Refills: 0 | Status: SHIPPED | OUTPATIENT
Start: 2025-06-03

## 2025-06-16 ENCOUNTER — HOSPITAL ENCOUNTER (OUTPATIENT)
Dept: GENERAL RADIOLOGY | Facility: HOSPITAL | Age: 80
Discharge: HOME | End: 2025-06-16
Admitting: PHYSICIAN ASSISTANT
Payer: MEDICARE

## 2025-06-16 ENCOUNTER — OFFICE VISIT (OUTPATIENT)
Dept: NEUROSURGERY | Facility: CLINIC | Age: 80
End: 2025-06-16
Payer: MEDICARE

## 2025-06-16 VITALS — BODY MASS INDEX: 34.38 KG/M2 | WEIGHT: 194 LBS | HEIGHT: 63 IN

## 2025-06-16 DIAGNOSIS — M50.30 DDD (DEGENERATIVE DISC DISEASE), CERVICAL: Primary | ICD-10-CM

## 2025-06-16 DIAGNOSIS — E66.09 CLASS 1 OBESITY DUE TO EXCESS CALORIES WITH SERIOUS COMORBIDITY AND BODY MASS INDEX (BMI) OF 34.0 TO 34.9 IN ADULT: ICD-10-CM

## 2025-06-16 DIAGNOSIS — E66.811 CLASS 1 OBESITY DUE TO EXCESS CALORIES WITH SERIOUS COMORBIDITY AND BODY MASS INDEX (BMI) OF 34.0 TO 34.9 IN ADULT: ICD-10-CM

## 2025-06-16 DIAGNOSIS — M50.30 DDD (DEGENERATIVE DISC DISEASE), CERVICAL: ICD-10-CM

## 2025-06-16 DIAGNOSIS — M54.12 CERVICAL RADICULOPATHY: ICD-10-CM

## 2025-06-16 PROCEDURE — 72050 X-RAY EXAM NECK SPINE 4/5VWS: CPT

## 2025-06-16 PROCEDURE — 99024 POSTOP FOLLOW-UP VISIT: CPT | Performed by: NEUROLOGICAL SURGERY

## 2025-06-16 NOTE — PROGRESS NOTES
"    Chief complaint:   Chief Complaint   Patient presents with    Post-op     8 week post op visit        Subjective     HPI: I had a chance to see Anitha today in follow-up.  Her x-rays show all of her instrumentation is intact however she is still having expected neck pain.  She does have multilevel degenerative disc disease and I do think at 3 months we may start some physical therapy for her and possibly some pain management.    Review of Systems      Objective      Vital Signs  Ht 160 cm (62.99\")   Wt 88 kg (194 lb)   BMI 34.38 kg/m²     Physical Exam  Eyes:      General: Lids are normal.      Extraocular Movements: Extraocular movements intact.   Neurological:      Motor: Motor strength is normal.  Psychiatric:         Speech: Speech normal.         Neurological Exam  Mental Status  Awake, alert and oriented to person, place and time. Oriented to person, place and time. Speech is normal. Language is fluent with no aphasia. Attention and concentration are normal. Fund of knowledge is appropriate for level of education.    Cranial Nerves  CN III, IV, VI: Extraocular movements intact bilaterally. Normal lids and orbits bilaterally.    Motor   No abnormal involuntary movements. Strength is 5/5 throughout all four extremities.    Gait  Casual gait is normal including stance, stride, and arm swing.       Imaging review: XR SPINE CERVICAL AP AND LAT W FLEX AND EXT-     HISTORY: S/P NECK FUSION; M50.30-Other cervical disc degeneration,  unspecified cervical region     COMPARISON: Intraoperative films 4/17/2025     FINDINGS: Frontal and lateral views of the cervical spine are obtained  as well as lateral flexion and extension views.     Anterior interbody fusion at the C4/5 level. Straightening of the normal  cervical lordosis with no abnormal subluxation. No hardware complication  identified.  Mild to moderate degenerative disc change C3/4.  Mild degenerative change at C2/3 and along the anterior tubulation of  C1/2. " Uncinate process hypertrophy with moderate facet osteoarthropathy.  No acute radiographic abnormality.     IMPRESSION:  1. Anterior interbody fusion at C4/5. No hardware complication. No  dynamic instability. Degenerative change described above.           Assessment/Plan:   Status post anterior cervical discectomy and fusion, still having some neck pain but just recently stopped her collar.  I would like to see her back in 8 weeks to check on her progress.  I look forward to seeing her at her next visit.    Patient is a nonsmoker  The patient's Body mass index is 34.38 kg/m².. BMI is above normal parameters. Recommendations include: continue with current weight loss program    Diagnoses and all orders for this visit:    1. DDD (degenerative disc disease), cervical (Primary)    2. Cervical radiculopathy    3. Class 1 obesity due to excess calories with serious comorbidity and body mass index (BMI) of 34.0 to 34.9 in adult        I discussed the patients findings and my recommendations with patient  Moncho Glover DO  06/16/25  09:05 CDT

## 2025-06-23 DIAGNOSIS — M54.81 OCCIPITAL NEURALGIA OF RIGHT SIDE: ICD-10-CM

## 2025-06-23 RX ORDER — METHOCARBAMOL 500 MG/1
500 TABLET, FILM COATED ORAL 3 TIMES DAILY
Qty: 90 TABLET | Refills: 0 | Status: SHIPPED | OUTPATIENT
Start: 2025-06-23

## 2025-06-24 RX ORDER — CYCLOBENZAPRINE HCL 10 MG
10 TABLET ORAL 3 TIMES DAILY PRN
Qty: 30 TABLET | Refills: 0 | Status: SHIPPED | OUTPATIENT
Start: 2025-06-24

## 2025-07-28 RX ORDER — CYCLOBENZAPRINE HCL 10 MG
10 TABLET ORAL 3 TIMES DAILY PRN
Qty: 30 TABLET | Refills: 0 | Status: SHIPPED | OUTPATIENT
Start: 2025-07-28

## 2025-08-11 ENCOUNTER — OFFICE VISIT (OUTPATIENT)
Age: 80
End: 2025-08-11
Payer: MEDICARE

## 2025-08-11 VITALS — BODY MASS INDEX: 34.66 KG/M2 | HEIGHT: 63 IN | WEIGHT: 195.6 LBS

## 2025-08-11 DIAGNOSIS — M54.81 OCCIPITAL NEURALGIA OF RIGHT SIDE: ICD-10-CM

## 2025-08-11 DIAGNOSIS — E66.811 CLASS 1 OBESITY DUE TO EXCESS CALORIES WITH SERIOUS COMORBIDITY AND BODY MASS INDEX (BMI) OF 34.0 TO 34.9 IN ADULT: ICD-10-CM

## 2025-08-11 DIAGNOSIS — E66.09 CLASS 1 OBESITY DUE TO EXCESS CALORIES WITH SERIOUS COMORBIDITY AND BODY MASS INDEX (BMI) OF 34.0 TO 34.9 IN ADULT: ICD-10-CM

## 2025-08-11 DIAGNOSIS — M50.30 DDD (DEGENERATIVE DISC DISEASE), CERVICAL: Primary | ICD-10-CM

## 2025-08-11 DIAGNOSIS — Z78.9 NONSMOKER: ICD-10-CM

## 2025-08-11 PROCEDURE — 99213 OFFICE O/P EST LOW 20 MIN: CPT | Performed by: PHYSICIAN ASSISTANT

## 2025-08-11 RX ORDER — HYDROCODONE BITARTRATE AND ACETAMINOPHEN 5; 325 MG/1; MG/1
1 TABLET ORAL EVERY 6 HOURS PRN
Qty: 20 TABLET | Refills: 0 | Status: SHIPPED | OUTPATIENT
Start: 2025-08-11 | End: 2025-08-16

## 2025-08-27 RX ORDER — CYCLOBENZAPRINE HCL 10 MG
10 TABLET ORAL 3 TIMES DAILY PRN
Qty: 30 TABLET | Refills: 0 | Status: SHIPPED | OUTPATIENT
Start: 2025-08-27

## (undated) DEVICE — DRSNG SURESITE WNDW 4X4.5

## (undated) DEVICE — GLV SURG DERMASSURE GRN LF PF 7.0

## (undated) DEVICE — DRAPE,THYROID,SOFT,STERILE: Brand: MEDLINE

## (undated) DEVICE — THE STERILE LIGHT HANDLE COVER IS USED WITH STERIS SURGICAL LIGHTING AND VISUALIZATION SYSTEMS.

## (undated) DEVICE — GLV SURG SENSICARE W/ALOE PF LF 8.5 STRL

## (undated) DEVICE — PK SPINE CERV ANT 30

## (undated) DEVICE — ELECTRD BLD EZ CLN MOD XLNG 2.75IN

## (undated) DEVICE — THE STERILE THE STERIS STERILE CAMERA HANDLE COVERS ARE DESIGNED FOR HARMONYAIR 4K CAMERA MODULE, AND PROVIDE STERILE CONTROL THAT ALLOW FOR INCREASING AND DECREASING ILLUMINATION THROUGH SEVEN INTENSITY LEVELS.

## (undated) DEVICE — GLV SURG DERMASSURE GRN LF PF 8.5

## (undated) DEVICE — ANTIBACTERIAL UNDYED BRAIDED (POLYGLACTIN 910), SYNTHETIC ABSORBABLE SUTURE: Brand: COATED VICRYL

## (undated) DEVICE — PROXIMATE RH ROTATING HEAD SKIN STAPLERS (35 WIDE) CONTAINS 35 STAINLESS STEEL STAPLES: Brand: PROXIMATE

## (undated) DEVICE — UTILITY MARKER W/MED LABELS: Brand: MEDLINE

## (undated) DEVICE — 3.0MM PRECISION NEURO (MATCH HEAD)

## (undated) DEVICE — GLV SURG BIOGEL LTX PF 6

## (undated) DEVICE — CONN FLX BREATHE CIRCT

## (undated) DEVICE — SPONGE,DISSECTOR,K,XRAY,9/16"X1/4",STRL: Brand: MEDLINE

## (undated) DEVICE — NEEDLE, QUINCKE, 18GX3.5": Brand: MEDLINE

## (undated) DEVICE — PACK,UNIVERSAL,NO GOWNS: Brand: MEDLINE

## (undated) DEVICE — APPL DURAPREP IODOPHOR APL 26ML

## (undated) DEVICE — CLTH CLENS READYCLEANSE PERI CARE PK/5

## (undated) DEVICE — TRAP FLD MINIVAC MEGADYNE 100ML